# Patient Record
Sex: MALE | Race: WHITE | NOT HISPANIC OR LATINO
[De-identification: names, ages, dates, MRNs, and addresses within clinical notes are randomized per-mention and may not be internally consistent; named-entity substitution may affect disease eponyms.]

---

## 2021-06-18 ENCOUNTER — LABORATORY RESULT (OUTPATIENT)
Age: 26
End: 2021-06-18

## 2021-06-18 ENCOUNTER — APPOINTMENT (OUTPATIENT)
Dept: DERMATOLOGY | Facility: CLINIC | Age: 26
End: 2021-06-18
Payer: COMMERCIAL

## 2021-06-18 ENCOUNTER — NON-APPOINTMENT (OUTPATIENT)
Age: 26
End: 2021-06-18

## 2021-06-18 VITALS — HEIGHT: 72 IN | WEIGHT: 165 LBS | BODY MASS INDEX: 22.35 KG/M2

## 2021-06-18 DIAGNOSIS — L70.0 ACNE VULGARIS: ICD-10-CM

## 2021-06-18 PROCEDURE — 99204 OFFICE O/P NEW MOD 45 MIN: CPT | Mod: 25

## 2021-06-18 PROCEDURE — 11102 TANGNTL BX SKIN SINGLE LES: CPT

## 2021-06-18 RX ORDER — CLINDAMYCIN PHOSPHATE 10 MG/ML
1 LOTION TOPICAL
Qty: 1 | Refills: 6 | Status: ACTIVE | COMMUNITY
Start: 2021-06-18 | End: 1900-01-01

## 2021-07-12 ENCOUNTER — NON-APPOINTMENT (OUTPATIENT)
Age: 26
End: 2021-07-12

## 2022-03-20 ENCOUNTER — TRANSCRIPTION ENCOUNTER (OUTPATIENT)
Age: 27
End: 2022-03-20

## 2022-04-29 ENCOUNTER — APPOINTMENT (OUTPATIENT)
Dept: INTERNAL MEDICINE | Facility: CLINIC | Age: 27
End: 2022-04-29

## 2022-04-29 ENCOUNTER — APPOINTMENT (OUTPATIENT)
Dept: INTERNAL MEDICINE | Facility: CLINIC | Age: 27
End: 2022-04-29
Payer: COMMERCIAL

## 2022-04-29 ENCOUNTER — NON-APPOINTMENT (OUTPATIENT)
Age: 27
End: 2022-04-29

## 2022-04-29 VITALS
BODY MASS INDEX: 20.45 KG/M2 | RESPIRATION RATE: 15 BRPM | OXYGEN SATURATION: 98 % | SYSTOLIC BLOOD PRESSURE: 105 MMHG | HEART RATE: 60 BPM | TEMPERATURE: 98.1 F | DIASTOLIC BLOOD PRESSURE: 65 MMHG | HEIGHT: 72 IN | WEIGHT: 151 LBS

## 2022-04-29 VITALS — SYSTOLIC BLOOD PRESSURE: 102 MMHG | DIASTOLIC BLOOD PRESSURE: 70 MMHG

## 2022-04-29 DIAGNOSIS — Z80.8 FAMILY HISTORY OF MALIGNANT NEOPLASM OF OTHER ORGANS OR SYSTEMS: ICD-10-CM

## 2022-04-29 DIAGNOSIS — Z82.69 FAMILY HISTORY OF OTHER DISEASES OF THE MUSCULOSKELETAL SYSTEM AND CONNECTIVE TISSUE: ICD-10-CM

## 2022-04-29 DIAGNOSIS — S61.219A LACERATION W/OUT FOREIGN BODY OF UNSPECIFIED FINGER W/OUT DAMAGE TO NAIL, INITIAL ENCOUNTER: ICD-10-CM

## 2022-04-29 DIAGNOSIS — Z80.42 FAMILY HISTORY OF MALIGNANT NEOPLASM OF PROSTATE: ICD-10-CM

## 2022-04-29 DIAGNOSIS — J45.990 EXERCISE INDUCED BRONCHOSPASM: ICD-10-CM

## 2022-04-29 DIAGNOSIS — Z81.8 FAMILY HISTORY OF OTHER MENTAL AND BEHAVIORAL DISORDERS: ICD-10-CM

## 2022-04-29 DIAGNOSIS — Z86.19 PERSONAL HISTORY OF OTHER INFECTIOUS AND PARASITIC DISEASES: ICD-10-CM

## 2022-04-29 DIAGNOSIS — Z80.3 FAMILY HISTORY OF MALIGNANT NEOPLASM OF BREAST: ICD-10-CM

## 2022-04-29 DIAGNOSIS — Z83.518 FAMILY HISTORY OF OTHER SPECIFIED EYE DISORDER: ICD-10-CM

## 2022-04-29 DIAGNOSIS — Z80.0 FAMILY HISTORY OF MALIGNANT NEOPLASM OF DIGESTIVE ORGANS: ICD-10-CM

## 2022-04-29 DIAGNOSIS — R31.29 OTHER MICROSCOPIC HEMATURIA: ICD-10-CM

## 2022-04-29 PROCEDURE — 99385 PREV VISIT NEW AGE 18-39: CPT

## 2022-04-29 RX ORDER — CALCIUM CARBONATE 500 MG/1
500 TABLET, CHEWABLE ORAL
Refills: 0 | Status: ACTIVE | COMMUNITY
Start: 2022-04-29

## 2022-04-29 NOTE — REVIEW OF SYSTEMS
[Recent Change In Weight] : ~T recent weight change [Joint Pain] : joint pain [Negative] : Heme/Lymph [Chest Pain] : no chest pain [Palpitations] : no palpitations [Claudication] : no  leg claudication [Lower Ext Edema] : no lower extremity edema [Shortness Of Breath] : no shortness of breath [Wheezing] : no wheezing [Cough] : no cough [Abdominal Pain] : no abdominal pain [Dyspnea on Exertion] : not dyspnea on exertion [Nausea] : no nausea [Constipation] : no constipation [Diarrhea] : no diarrhea [Vomiting] : no vomiting [Heartburn] : no heartburn [Melena] : no melena [Dysuria] : no dysuria [Incontinence] : no incontinence [Nocturia] : no nocturia [Hematuria] : no hematuria [Joint Stiffness] : no joint stiffness [Muscle Pain] : no muscle pain [Back Pain] : no back pain [Joint Swelling] : no joint swelling [Itching] : no itching [Mole Changes] : no mole changes [Skin Rash] : no skin rash [Headache] : no headache [Dizziness] : no dizziness [Fainting] : no fainting [Unsteady Walk] : no ataxia [Insomnia] : no insomnia [Anxiety] : no anxiety [Depression] : no depression [Easy Bleeding] : no easy bleeding [Easy Bruising] : no easy bruising [Swollen Glands] : no swollen glands [FreeTextEntry2] : Intentional 15 lbs weight loss in less than a year. [FreeTextEntry3] : Wears corrective lens, [FreeTextEntry4] : cc ringing in the ear, works in boiler with very loud noise [FreeTextEntry9] : Occ knee pain if he sits too much,  [FreeTextEntry7] : Takes TUMs about once a month,

## 2022-04-29 NOTE — HEALTH RISK ASSESSMENT
[Very Good] : ~his/her~  mood as very good [Never] : Never [No] : In the past 12 months have you used drugs other than those required for medical reasons? No [One fall no injury in past year] : Patient reported one fall in the past year without injury [0] : 2) Feeling down, depressed, or hopeless: Not at all (0) [PHQ-2 Negative - No further assessment needed] : PHQ-2 Negative - No further assessment needed [HIV test declined] : HIV test declined [Hepatitis C test declined] : Hepatitis C test declined [None] : None [With Significant Other] : lives with significant other [# of Members in Household ___] :  household currently consist of [unfilled] member(s) [Employed] : employed [College] : College [Significant Other] : lives with significant other [# Of Children ___] : has [unfilled] children [Feels Safe at Home] : Feels safe at home [Fully functional (bathing, dressing, toileting, transferring, walking, feeding)] : Fully functional (bathing, dressing, toileting, transferring, walking, feeding) [Fully functional (using the telephone, shopping, preparing meals, housekeeping, doing laundry, using] : Fully functional and needs no help or supervision to perform IADLs (using the telephone, shopping, preparing meals, housekeeping, doing laundry, using transportation, managing medications and managing finances) [Smoke Detector] : smoke detector [Carbon Monoxide Detector] : carbon monoxide detector [Seat Belt] :  uses seat belt [Audit-CScore] : 0 [de-identified] : Occ long walk, no formal exercise, [[id number]] : 0 [EyeExamDate] : 2018 [Change in mental status noted] : No change in mental status noted [Reports changes in hearing] : Reports no changes in hearing [Reports changes in vision] : Reports no changes in vision [Reports changes in dental health] : Reports no changes in dental health [de-identified] : dentist - 2/2022

## 2022-04-29 NOTE — PHYSICAL EXAM
[No Acute Distress] : no acute distress [Well Nourished] : well nourished [Well Developed] : well developed [Well-Appearing] : well-appearing [Normal Sclera/Conjunctiva] : normal sclera/conjunctiva [PERRL] : pupils equal round and reactive to light [EOMI] : extraocular movements intact [Normal Outer Ear/Nose] : the outer ears and nose were normal in appearance [Normal Oropharynx] : the oropharynx was normal [Normal TMs] : both tympanic membranes were normal [Normal Nasal Mucosa] : the nasal mucosa was normal [No JVD] : no jugular venous distention [No Lymphadenopathy] : no lymphadenopathy [Supple] : supple [No Respiratory Distress] : no respiratory distress  [Clear to Auscultation] : lungs were clear to auscultation bilaterally [Normal Rate] : normal rate  [Regular Rhythm] : with a regular rhythm [Normal S1, S2] : normal S1 and S2 [No Carotid Bruits] : no carotid bruits [Pedal Pulses Present] : the pedal pulses are present [No Edema] : there was no peripheral edema [No Extremity Clubbing/Cyanosis] : no extremity clubbing/cyanosis [Normal Appearance] : normal in appearance [No Masses] : no palpable masses [No Nipple Discharge] : no nipple discharge [No Axillary Lymphadenopathy] : no axillary lymphadenopathy [Soft] : abdomen soft [Non Tender] : non-tender [Non-distended] : non-distended [Normal Bowel Sounds] : normal bowel sounds [Normal Supraclavicular Nodes] : no supraclavicular lymphadenopathy [Normal Axillary Nodes] : no axillary lymphadenopathy [Normal Posterior Cervical Nodes] : no posterior cervical lymphadenopathy [Normal Anterior Cervical Nodes] : no anterior cervical lymphadenopathy [No CVA Tenderness] : no CVA  tenderness [No Spinal Tenderness] : no spinal tenderness [No Joint Swelling] : no joint swelling [Grossly Normal Strength/Tone] : grossly normal strength/tone [No Rash] : no rash [Coordination Grossly Intact] : coordination grossly intact [No Focal Deficits] : no focal deficits [Normal Gait] : normal gait [Speech Grossly Normal] : speech grossly normal [Normal Affect] : the affect was normal [Alert and Oriented x3] : oriented to person, place, and time [Normal Mood] : the mood was normal [de-identified] : male in stated age,

## 2022-04-29 NOTE — HISTORY OF PRESENT ILLNESS
[FreeTextEntry1] : Patient presented for the first time for transition of care, she's looking for a new PCP and requesting a PE.  Last PE was in 2018. [de-identified] : He feels well and has no new complaint.\par \par Pt was well and did not get sick throughout the COVID pandemic.  He had 3 doses COVID vaccine.  He is concerned about asymptomatic infection, and would like to have antibody test.

## 2022-04-29 NOTE — ASSESSMENT
[FreeTextEntry1] : Pt presented for the first time for transition of care and PE.  He feels well w/o any complaint.  Exam was unremarkable.  He was reminded to have routine eye exam and dental care.  I also gave him Rx to check routine labs.

## 2022-05-02 LAB
25(OH)D3 SERPL-MCNC: 13 NG/ML
ALBUMIN SERPL ELPH-MCNC: 4.7 G/DL
ALP BLD-CCNC: 92 U/L
ALT SERPL-CCNC: 29 U/L
ANION GAP SERPL CALC-SCNC: 11 MMOL/L
APPEARANCE: CLEAR
AST SERPL-CCNC: 26 U/L
BASOPHILS # BLD AUTO: 0.06 K/UL
BASOPHILS NFR BLD AUTO: 1.2 %
BILIRUB SERPL-MCNC: 0.3 MG/DL
BILIRUBIN URINE: NEGATIVE
BLOOD URINE: NEGATIVE
BUN SERPL-MCNC: 18 MG/DL
CALCIUM SERPL-MCNC: 9.9 MG/DL
CHLORIDE SERPL-SCNC: 106 MMOL/L
CHOLEST SERPL-MCNC: 181 MG/DL
CO2 SERPL-SCNC: 22 MMOL/L
COLOR: COLORLESS
COVID-19 NUCLEOCAPSID  GAM ANTIBODY INTERPRETATION: NEGATIVE
COVID-19 SPIKE DOMAIN ANTIBODY INTERPRETATION: POSITIVE
CREAT SERPL-MCNC: 1.06 MG/DL
EGFR: 98 ML/MIN/1.73M2
EOSINOPHIL # BLD AUTO: 0.09 K/UL
EOSINOPHIL NFR BLD AUTO: 1.8 %
FOLATE SERPL-MCNC: 5.6 NG/ML
GLUCOSE QUALITATIVE U: NEGATIVE
GLUCOSE SERPL-MCNC: 95 MG/DL
HCT VFR BLD CALC: 44.3 %
HDLC SERPL-MCNC: 62 MG/DL
HGB BLD-MCNC: 14 G/DL
IMM GRANULOCYTES NFR BLD AUTO: 0.2 %
KETONES URINE: NEGATIVE
LDLC SERPL CALC-MCNC: 108 MG/DL
LEUKOCYTE ESTERASE URINE: NEGATIVE
LYMPHOCYTES # BLD AUTO: 1.71 K/UL
LYMPHOCYTES NFR BLD AUTO: 34.1 %
MAN DIFF?: NORMAL
MCHC RBC-ENTMCNC: 29.3 PG
MCHC RBC-ENTMCNC: 31.6 GM/DL
MCV RBC AUTO: 92.7 FL
MONOCYTES # BLD AUTO: 0.66 K/UL
MONOCYTES NFR BLD AUTO: 13.1 %
NEUTROPHILS # BLD AUTO: 2.49 K/UL
NEUTROPHILS NFR BLD AUTO: 49.6 %
NITRITE URINE: NEGATIVE
NONHDLC SERPL-MCNC: 120 MG/DL
PH URINE: 6
PLATELET # BLD AUTO: 360 K/UL
POTASSIUM SERPL-SCNC: 5.3 MMOL/L
PROT SERPL-MCNC: 6.9 G/DL
PROTEIN URINE: NEGATIVE
RBC # BLD: 4.78 M/UL
RBC # FLD: 11.8 %
SARS-COV-2 AB SERPL IA-ACNC: >250 U/ML
SARS-COV-2 AB SERPL QL IA: 0.07 INDEX
SODIUM SERPL-SCNC: 139 MMOL/L
SPECIFIC GRAVITY URINE: 1.01
TRIGL SERPL-MCNC: 60 MG/DL
UROBILINOGEN URINE: NORMAL
VIT B12 SERPL-MCNC: 378 PG/ML
WBC # FLD AUTO: 5.02 K/UL

## 2022-08-25 ENCOUNTER — NON-APPOINTMENT (OUTPATIENT)
Age: 27
End: 2022-08-25

## 2022-08-26 ENCOUNTER — TRANSCRIPTION ENCOUNTER (OUTPATIENT)
Age: 27
End: 2022-08-26

## 2022-12-30 ENCOUNTER — APPOINTMENT (OUTPATIENT)
Dept: INTERNAL MEDICINE | Facility: CLINIC | Age: 27
End: 2022-12-30
Payer: COMMERCIAL

## 2022-12-30 PROCEDURE — 99441: CPT

## 2023-01-09 NOTE — ADDENDUM
[FreeTextEntry1] : I got an email from coding department that this pt's diagnosis cannot be accepted for billing as tick bite.\par \par I called pt and finally got through to him, clarified that the tick bite was on R thigh.  Diagnosis updated.

## 2023-01-09 NOTE — PHYSICAL EXAM
[No Acute Distress] : no acute distress [Speech Grossly Normal] : speech grossly normal [Alert and Oriented x3] : oriented to person, place, and time [de-identified] : Unable to examine patient due to telephonic encounter,

## 2023-01-09 NOTE — HISTORY OF PRESENT ILLNESS
[FreeTextEntry8] : Pt called today to request blood test.\par \par Patient apparently was exposed to a tick bite a few months ago in [facility] at [facility].  He was asymptomatic but was evaluated at urgent care center.  At that time he was treated with 1 dose of doxycycline.  He was advised to have labs in a few months.\par \par Patient is asymptomatic at present.  He requested to be tested for both Lyme disease and [place] spotted fever.

## 2023-02-23 ENCOUNTER — NON-APPOINTMENT (OUTPATIENT)
Age: 28
End: 2023-02-23

## 2023-02-24 ENCOUNTER — NON-APPOINTMENT (OUTPATIENT)
Age: 28
End: 2023-02-24

## 2023-02-24 ENCOUNTER — APPOINTMENT (OUTPATIENT)
Dept: INTERNAL MEDICINE | Facility: CLINIC | Age: 28
End: 2023-02-24
Payer: COMMERCIAL

## 2023-02-24 VITALS — TEMPERATURE: 98.1 F

## 2023-02-24 VITALS — HEIGHT: 72 IN | WEIGHT: 153 LBS | BODY MASS INDEX: 20.72 KG/M2

## 2023-02-24 VITALS
SYSTOLIC BLOOD PRESSURE: 112 MMHG | RESPIRATION RATE: 14 BRPM | DIASTOLIC BLOOD PRESSURE: 76 MMHG | HEART RATE: 72 BPM | OXYGEN SATURATION: 97 %

## 2023-02-24 PROCEDURE — 99202 OFFICE O/P NEW SF 15 MIN: CPT | Mod: 25

## 2023-02-24 PROCEDURE — 99212 OFFICE O/P EST SF 10 MIN: CPT

## 2023-02-24 NOTE — PHYSICAL EXAM
[No Acute Distress] : no acute distress [Well Nourished] : well nourished [Well-Appearing] : well-appearing [No Rash] : no rash [No Skin Lesions] : no skin lesions [Normal] : affect was normal and insight and judgment were intact

## 2023-02-24 NOTE — HISTORY OF PRESENT ILLNESS
[FreeTextEntry8] : LUIS FELIPE ALMANZA is a 27 year old male who presents for a new patient/focused evaluation, to establish care.\par He requests testing for Lyme Disease and [place] Spotted Fever:\par He states that sometime during the summer 2022 had a tick bite on his right thigh while on [facility], evaluated at local urgent care, given one dose of Doxycycline, no subsequent rash/fever/chills/joint pains. He is requesting testing for Lyme and RMSF out of precaution as he states he was in an area where he was told Lyme and RMSF are endemic. \par

## 2023-02-24 NOTE — PLAN
[FreeTextEntry1] : Subsequent visit for h/o right thigh tick bite: no induration or rash noted in area of prior tick bite. No systemic symptoms of illness. Per patient's request and out of an abundance of precaution given the area where he had the tick bite, labs drawn for Lyme and RMSF today.

## 2023-02-28 ENCOUNTER — TRANSCRIPTION ENCOUNTER (OUTPATIENT)
Age: 28
End: 2023-02-28

## 2023-02-28 LAB
B BURGDOR AB SER-IMP: NEGATIVE
B BURGDOR IGG+IGM SER QL: 0.15 INDEX
R RICKETTSI IGG CSF-ACNC: NEGATIVE
R RICKETTSI IGM CSF-ACNC: 0.84 INDEX

## 2023-03-17 ENCOUNTER — TRANSCRIPTION ENCOUNTER (OUTPATIENT)
Age: 28
End: 2023-03-17

## 2023-05-13 ENCOUNTER — NON-APPOINTMENT (OUTPATIENT)
Age: 28
End: 2023-05-13

## 2023-05-13 ENCOUNTER — LABORATORY RESULT (OUTPATIENT)
Age: 28
End: 2023-05-13

## 2023-05-13 ENCOUNTER — APPOINTMENT (OUTPATIENT)
Dept: INTERNAL MEDICINE | Facility: CLINIC | Age: 28
End: 2023-05-13
Payer: COMMERCIAL

## 2023-05-13 VITALS — HEIGHT: 72 IN | WEIGHT: 150 LBS | BODY MASS INDEX: 20.32 KG/M2

## 2023-05-13 VITALS
RESPIRATION RATE: 14 BRPM | HEART RATE: 72 BPM | OXYGEN SATURATION: 98 % | DIASTOLIC BLOOD PRESSURE: 66 MMHG | SYSTOLIC BLOOD PRESSURE: 116 MMHG
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
## 2023-05-13 DIAGNOSIS — Z11.59 ENCOUNTER FOR SCREENING FOR OTHER VIRAL DISEASES: ICD-10-CM
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
## 2023-05-13 DIAGNOSIS — S70.369A INSECT BITE (NONVENOMOUS), UNSPECIFIED THIGH, INITIAL ENCOUNTER: ICD-10-CM

## 2023-05-13 DIAGNOSIS — Z86.03 PERSONAL HISTORY OF NEOPLASM OF UNCERTAIN BEHAVIOR: ICD-10-CM

## 2023-05-13 DIAGNOSIS — S70.361D INSECT BITE (NONVENOMOUS), RIGHT THIGH, SUBSEQUENT ENCOUNTER: ICD-10-CM

## 2023-05-13 PROCEDURE — 36415 COLL VENOUS BLD VENIPUNCTURE: CPT

## 2023-05-13 PROCEDURE — 93000 ELECTROCARDIOGRAM COMPLETE: CPT | Mod: 59

## 2023-05-13 PROCEDURE — 99395 PREV VISIT EST AGE 18-39: CPT | Mod: 25

## 2023-05-13 PROCEDURE — G0444 DEPRESSION SCREEN ANNUAL: CPT | Mod: 59

## 2023-05-13 NOTE — HISTORY OF PRESENT ILLNESS
[Other: _____] : [unfilled] [FreeTextEntry1] : annual physical, c/o occasional coolness or hands/feet [de-identified] : LUIS FELIPE ALMANZA is a 27 year old male who presents for annual comprehensive exam.\par Overall he feels pretty well.\par -For many years he feels randomly his fingertips and toes feel "cold" when the rest of his body is warm. He denies any associated discomfort/pain, bluish/yellow discoloration or numbness of digits. He doesn't think it has any particular trigger such as going from warm to cool environment, after shower, when exerting himself, etc. \par -Romel generally healthy/balanced diet. Treadmill ~1x/week for 1 hour. Active at work, not currently tracking his steps.

## 2023-05-13 NOTE — PLAN
[FreeTextEntry1] : #Subjective coolness of extremities: exam as above--normal cap refill, normal pulses. Encouraged to wear warm gloves/socks. Reviewed with patient if develops any concerning symptoms (such as discoloration of digits, pain/discomfort), to update me on this.\par \par Healthy diet and exercise reviewed.\par \par Flu, COVID19, Tetanus: all UTD per patient\par Labs drawn in office, will f/u.
Authored by Attending

## 2023-05-13 NOTE — HEALTH RISK ASSESSMENT
[Very Good] : ~his/her~  mood as very good [No] : In the past 12 months have you used drugs other than those required for medical reasons? No [No falls in past year] : Patient reported no falls in the past year [0] : 2) Feeling down, depressed, or hopeless: Not at all (0) [PHQ-2 Negative - No further assessment needed] : PHQ-2 Negative - No further assessment needed [HIV test declined] : HIV test declined [Hepatitis C test declined] : Hepatitis C test declined [With Significant Other] : lives with significant other [None] : None [Employed] : employed [Significant Other] : lives with significant other [Feels Safe at Home] : Feels safe at home [Fully functional (bathing, dressing, toileting, transferring, walking, feeding)] : Fully functional (bathing, dressing, toileting, transferring, walking, feeding) [Fully functional (using the telephone, shopping, preparing meals, housekeeping, doing laundry, using] : Fully functional and needs no help or supervision to perform IADLs (using the telephone, shopping, preparing meals, housekeeping, doing laundry, using transportation, managing medications and managing finances) [Reports normal functional visual acuity (ie: able to read med bottle)] : Reports normal functional visual acuity [Smoke Detector] : smoke detector [Carbon Monoxide Detector] : carbon monoxide detector [Safety elements used in home] : safety elements used in home [Seat Belt] :  uses seat belt [Sunscreen] : uses sunscreen [Never] : Never [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, dentist [de-identified] : as above [de-identified] : as above [[id number]] : 0 [Change in mental status noted] : No change in mental status noted [Language] : denies difficulty with language [Behavior] : denies difficulty with behavior [Learning/Retaining New Information] : denies difficulty learning/retaining new information [Handling Complex Tasks] : denies difficulty handling complex tasks [Reasoning] : denies difficulty with reasoning [Spatial Ability and Orientation] : denies difficulty with spatial ability and orientation [Sexually Active] : not sexually active [Reports changes in hearing] : Reports no changes in hearing [Reports changes in dental health] : Reports no changes in dental health [Reports changes in vision] : Reports no changes in vision [TB Exposure] : is not being exposed to tuberculosis [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

## 2023-05-13 NOTE — PHYSICAL EXAM
[No Carotid Bruits] : no carotid bruits [No Abdominal Bruit] : a ~M bruit was not heard ~T in the abdomen [No Varicosities] : no varicosities [Pedal Pulses Present] : the pedal pulses are present [No Edema] : there was no peripheral edema [No Extremity Clubbing/Cyanosis] : no extremity clubbing/cyanosis [No Palpable Aorta] : no palpable aorta [No Axillary Lymphadenopathy] : no axillary lymphadenopathy [Normal] : soft, non-tender, non-distended, no masses palpated, no HSM and normal bowel sounds [Urethral Meatus] : meatus normal [Penis Abnormality] : normal circumcised penis [Testes Tenderness] : no tenderness of the testes [Testes Mass (___cm)] : there were no testicular masses [de-identified] : grossly normal capillary refill, 2+ distal pulses all extremities

## 2023-07-27 LAB
25(OH)D3 SERPL-MCNC: 37.5 NG/ML
ALBUMIN SERPL ELPH-MCNC: 4.5 G/DL
ALP BLD-CCNC: 83 U/L
ALT SERPL-CCNC: 30 U/L
ANION GAP SERPL CALC-SCNC: 14 MMOL/L
APPEARANCE: ABNORMAL
AST SERPL-CCNC: 26 U/L
BASOPHILS # BLD AUTO: 0.06 K/UL
BASOPHILS NFR BLD AUTO: 1.7 %
BILIRUB SERPL-MCNC: 0.2 MG/DL
BILIRUBIN URINE: ABNORMAL
BLOOD URINE: NEGATIVE
BUN SERPL-MCNC: 15 MG/DL
CALCIUM SERPL-MCNC: 9.7 MG/DL
CHLORIDE SERPL-SCNC: 109 MMOL/L
CHOLEST SERPL-MCNC: 226 MG/DL
CO2 SERPL-SCNC: 22 MMOL/L
COLOR: NORMAL
CREAT SERPL-MCNC: 1.08 MG/DL
EGFR: 95 ML/MIN/1.73M2
EOSINOPHIL # BLD AUTO: 0.1 K/UL
EOSINOPHIL NFR BLD AUTO: 2.8 %
ESTIMATED AVERAGE GLUCOSE: 100 MG/DL
GLUCOSE QUALITATIVE U: NEGATIVE MG/DL
GLUCOSE SERPL-MCNC: 91 MG/DL
HBA1C MFR BLD HPLC: 5.1 %
HCT VFR BLD CALC: 39.5 %
HDLC SERPL-MCNC: 77 MG/DL
HGB BLD-MCNC: 12.6 G/DL
IMM GRANULOCYTES NFR BLD AUTO: 0 %
KETONES URINE: ABNORMAL MG/DL
LDLC SERPL CALC-MCNC: 128 MG/DL
LEUKOCYTE ESTERASE URINE: NEGATIVE
LYMPHOCYTES # BLD AUTO: 1.4 K/UL
LYMPHOCYTES NFR BLD AUTO: 39.2 %
MAN DIFF?: NORMAL
MCHC RBC-ENTMCNC: 28.4 PG
MCHC RBC-ENTMCNC: 31.9 GM/DL
MCV RBC AUTO: 89.2 FL
MONOCYTES # BLD AUTO: 0.5 K/UL
MONOCYTES NFR BLD AUTO: 14 %
NEUTROPHILS # BLD AUTO: 1.51 K/UL
NEUTROPHILS NFR BLD AUTO: 42.3 %
NITRITE URINE: NEGATIVE
NONHDLC SERPL-MCNC: 150 MG/DL
PH URINE: 6
PLATELET # BLD AUTO: 332 K/UL
POTASSIUM SERPL-SCNC: 4.3 MMOL/L
PROT SERPL-MCNC: 7 G/DL
PROTEIN URINE: NORMAL MG/DL
RBC # BLD: 4.43 M/UL
RBC # FLD: 13.1 %
SODIUM SERPL-SCNC: 146 MMOL/L
SPECIFIC GRAVITY URINE: 1.03
TRIGL SERPL-MCNC: 107 MG/DL
TSH SERPL-ACNC: 0.89 UIU/ML
UROBILINOGEN URINE: 1 MG/DL
WBC # FLD AUTO: 3.57 K/UL

## 2023-08-06 ENCOUNTER — NON-APPOINTMENT (OUTPATIENT)
Age: 28
End: 2023-08-06

## 2023-08-31 ENCOUNTER — TRANSCRIPTION ENCOUNTER (OUTPATIENT)
Age: 28
End: 2023-08-31

## 2023-09-06 LAB
ALBUMIN SERPL ELPH-MCNC: 4.8 G/DL
ALP BLD-CCNC: 84 U/L
ALT SERPL-CCNC: 25 U/L
ANION GAP SERPL CALC-SCNC: 11 MMOL/L
AST SERPL-CCNC: 23 U/L
BILIRUB SERPL-MCNC: 0.3 MG/DL
BUN SERPL-MCNC: 14 MG/DL
CALCIUM SERPL-MCNC: 9.9 MG/DL
CHLORIDE SERPL-SCNC: 106 MMOL/L
CHOLEST SERPL-MCNC: 200 MG/DL
CO2 SERPL-SCNC: 23 MMOL/L
CREAT SERPL-MCNC: 0.99 MG/DL
EGFR: 106 ML/MIN/1.73M2
FERRITIN SERPL-MCNC: 10 NG/ML
FOLATE SERPL-MCNC: 4.2 NG/ML
GLUCOSE SERPL-MCNC: 94 MG/DL
HDLC SERPL-MCNC: 71 MG/DL
IRON SATN MFR SERPL: 5 %
IRON SERPL-MCNC: 23 UG/DL
LDLC SERPL CALC-MCNC: 117 MG/DL
NONHDLC SERPL-MCNC: 129 MG/DL
POTASSIUM SERPL-SCNC: 4.8 MMOL/L
PROT SERPL-MCNC: 7.1 G/DL
SODIUM SERPL-SCNC: 140 MMOL/L
TIBC SERPL-MCNC: 470 UG/DL
TRIGL SERPL-MCNC: 67 MG/DL
UIBC SERPL-MCNC: 448 UG/DL
VIT B12 SERPL-MCNC: 666 PG/ML

## 2023-09-28 ENCOUNTER — TRANSCRIPTION ENCOUNTER (OUTPATIENT)
Age: 28
End: 2023-09-28

## 2023-09-30 ENCOUNTER — TRANSCRIPTION ENCOUNTER (OUTPATIENT)
Age: 28
End: 2023-09-30

## 2023-10-02 ENCOUNTER — APPOINTMENT (OUTPATIENT)
Dept: GASTROENTEROLOGY | Facility: CLINIC | Age: 28
End: 2023-10-02
Payer: COMMERCIAL

## 2023-10-02 VITALS
SYSTOLIC BLOOD PRESSURE: 114 MMHG | HEART RATE: 65 BPM | DIASTOLIC BLOOD PRESSURE: 60 MMHG | HEIGHT: 72 IN | BODY MASS INDEX: 22.08 KG/M2 | OXYGEN SATURATION: 97 % | WEIGHT: 163 LBS | TEMPERATURE: 97.7 F | RESPIRATION RATE: 14 BRPM

## 2023-10-02 PROCEDURE — 99203 OFFICE O/P NEW LOW 30 MIN: CPT

## 2023-11-30 ENCOUNTER — TRANSCRIPTION ENCOUNTER (OUTPATIENT)
Age: 28
End: 2023-11-30

## 2023-12-06 LAB
BASOPHILS # BLD AUTO: 0.05 K/UL
BASOPHILS NFR BLD AUTO: 1.1 %
EOSINOPHIL # BLD AUTO: 0.13 K/UL
EOSINOPHIL NFR BLD AUTO: 2.8 %
FERRITIN SERPL-MCNC: 46 NG/ML
HCT VFR BLD CALC: 45.1 %
HGB BLD-MCNC: 14.5 G/DL
IMM GRANULOCYTES NFR BLD AUTO: 0.2 %
IRON SATN MFR SERPL: 42 %
IRON SERPL-MCNC: 160 UG/DL
LYMPHOCYTES # BLD AUTO: 2.12 K/UL
LYMPHOCYTES NFR BLD AUTO: 45 %
MAN DIFF?: NORMAL
MCHC RBC-ENTMCNC: 27.6 PG
MCHC RBC-ENTMCNC: 32.2 GM/DL
MCV RBC AUTO: 85.9 FL
MONOCYTES # BLD AUTO: 0.63 K/UL
MONOCYTES NFR BLD AUTO: 13.4 %
NEUTROPHILS # BLD AUTO: 1.77 K/UL
NEUTROPHILS NFR BLD AUTO: 37.5 %
PLATELET # BLD AUTO: 341 K/UL
RBC # BLD: 5.25 M/UL
RBC # BLD: 5.25 M/UL
RBC # FLD: 16.1 %
RETICS # AUTO: 1.1 %
RETICS AGGREG/RBC NFR: 56.7 K/UL
TIBC SERPL-MCNC: 386 UG/DL
UIBC SERPL-MCNC: 226 UG/DL
WBC # FLD AUTO: 4.71 K/UL

## 2023-12-11 ENCOUNTER — APPOINTMENT (OUTPATIENT)
Dept: GASTROENTEROLOGY | Facility: CLINIC | Age: 28
End: 2023-12-11

## 2023-12-23 ENCOUNTER — TRANSCRIPTION ENCOUNTER (OUTPATIENT)
Age: 28
End: 2023-12-23

## 2023-12-26 ENCOUNTER — TRANSCRIPTION ENCOUNTER (OUTPATIENT)
Age: 28
End: 2023-12-26

## 2024-03-03 ENCOUNTER — TRANSCRIPTION ENCOUNTER (OUTPATIENT)
Age: 29
End: 2024-03-03

## 2024-03-04 ENCOUNTER — APPOINTMENT (OUTPATIENT)
Dept: DERMATOLOGY | Facility: CLINIC | Age: 29
End: 2024-03-04
Payer: COMMERCIAL

## 2024-03-04 DIAGNOSIS — Z12.83 ENCOUNTER FOR SCREENING FOR MALIGNANT NEOPLASM OF SKIN: ICD-10-CM

## 2024-03-04 DIAGNOSIS — D22.9 MELANOCYTIC NEVI, UNSPECIFIED: ICD-10-CM

## 2024-03-04 DIAGNOSIS — L72.0 EPIDERMAL CYST: ICD-10-CM

## 2024-03-04 PROCEDURE — 99213 OFFICE O/P EST LOW 20 MIN: CPT

## 2024-06-13 ENCOUNTER — NON-APPOINTMENT (OUTPATIENT)
Age: 29
End: 2024-06-13

## 2024-06-13 ENCOUNTER — APPOINTMENT (OUTPATIENT)
Dept: OPHTHALMOLOGY | Facility: CLINIC | Age: 29
End: 2024-06-13
Payer: COMMERCIAL

## 2024-06-13 PROCEDURE — 92004 COMPRE OPH EXAM NEW PT 1/>: CPT

## 2024-06-14 ENCOUNTER — NON-APPOINTMENT (OUTPATIENT)
Age: 29
End: 2024-06-14

## 2024-06-14 ENCOUNTER — LABORATORY RESULT (OUTPATIENT)
Age: 29
End: 2024-06-14

## 2024-06-14 ENCOUNTER — APPOINTMENT (OUTPATIENT)
Dept: INTERNAL MEDICINE | Facility: CLINIC | Age: 29
End: 2024-06-14
Payer: COMMERCIAL

## 2024-06-14 VITALS — HEIGHT: 72 IN | WEIGHT: 165 LBS | BODY MASS INDEX: 22.35 KG/M2

## 2024-06-14 VITALS
TEMPERATURE: 98.6 F | HEART RATE: 52 BPM | SYSTOLIC BLOOD PRESSURE: 98 MMHG | DIASTOLIC BLOOD PRESSURE: 62 MMHG | RESPIRATION RATE: 14 BRPM | OXYGEN SATURATION: 98 %

## 2024-06-14 DIAGNOSIS — D50.9 IRON DEFICIENCY ANEMIA, UNSPECIFIED: ICD-10-CM

## 2024-06-14 DIAGNOSIS — E78.5 HYPERLIPIDEMIA, UNSPECIFIED: ICD-10-CM

## 2024-06-14 DIAGNOSIS — Z00.00 ENCOUNTER FOR GENERAL ADULT MEDICAL EXAMINATION W/OUT ABNORMAL FINDINGS: ICD-10-CM

## 2024-06-14 DIAGNOSIS — Z86.2 PERSONAL HISTORY OF DISEASES OF THE BLOOD AND BLOOD-FORMING ORGANS AND CERTAIN DISORDERS INVOLVING THE IMMUNE MECHANISM: ICD-10-CM

## 2024-06-14 PROCEDURE — 93000 ELECTROCARDIOGRAM COMPLETE: CPT

## 2024-06-14 PROCEDURE — 36415 COLL VENOUS BLD VENIPUNCTURE: CPT

## 2024-06-14 PROCEDURE — 99395 PREV VISIT EST AGE 18-39: CPT

## 2024-06-14 RX ORDER — CHLORHEXIDINE GLUCONATE 4 %
325 (65 FE) LIQUID (ML) TOPICAL
Refills: 0 | Status: ACTIVE | COMMUNITY
Start: 2024-06-14

## 2024-06-14 RX ORDER — ADHESIVE TAPE 3"X 2.3 YD
50 MCG TAPE, NON-MEDICATED TOPICAL
Refills: 0 | Status: ACTIVE | COMMUNITY
Start: 2024-06-14

## 2024-06-14 NOTE — HISTORY OF PRESENT ILLNESS
[Spouse] : spouse [FreeTextEntry1] : annual physical follow-up iron deficiency [de-identified] : LUIS FELIPE ALMANZA is a 28 year old male who presents for annual comprehensive exam. He updates that he did meet with Dr. Ruiz (GI) re: Iron deficiency anemia whose impression was that given patient was donating blood ~q2 months, this was likely cause of IGNACIO in his case. He was initiated on daily iron, then as CBC noted normal H/H, was lowered to taking 3x/week which he has continued to date. He feels just the same, was never fatigued even while anemic. -Admits does not exercise much at all. Diet generally healthy--has started eating more fruit over the past year. -Left wrist pain occasionally at medial aspect and wrist. Initially had slight numbness in fingertips which resolved with using a wrist splint, then this pain started, usually after driving/turning wrist repetitively when turning the wheel. He is right-handed. No pain during exam today.

## 2024-06-14 NOTE — PLAN
[FreeTextEntry1] : #Iron deficiency anemia: continue Ferrous sulfate 325 mg 3x/week, recheck CBC with iron panel--if all normal, plan to stop iron supplement and repeat CBC in 3 months. Advised him to also update Dr. Ruiz once labwork reviewed. 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 If still persists despite above to contact me to update.    Reviewed healthy diet, encouraged him fort his year to start regular physical activity with goal of 5 days/wk 30 min sessions of cardio and light weight training.  COVID19 fully vaccinated/boostered per patient Flu vaccine missed last season Tdap UTD  F/u yearly or sooner if labs necessitate or patient's needs require.

## 2024-06-14 NOTE — HEALTH RISK ASSESSMENT
[Very Good] : ~his/her~  mood as very good [NO] : No [HIV Test offered] : HIV Test offered [Hepatitis C test offered] : Hepatitis C test offered [No] : In the past 12 months have you used drugs other than those required for medical reasons? No [No falls in past year] : Patient reported no falls in the past year [0] : 2) Feeling down, depressed, or hopeless: Not at all (0) [PHQ-2 Negative - No further assessment needed] : PHQ-2 Negative - No further assessment needed [Never] : Never [None] : None [Employed] : employed [] :  [# Of Children ___] : has [unfilled] children [Sexually Active] : sexually active [Reports normal functional visual acuity (ie: able to read med bottle)] : Reports normal functional visual acuity [Smoke Detector] : smoke detector [Carbon Monoxide Detector] : carbon monoxide detector [Safety elements used in home] : safety elements used in home [Seat Belt] :  uses seat belt [Sunscreen] : uses sunscreen [de-identified] : as above [de-identified] : as above [[id number]] : 0 [Change in mental status noted] : No change in mental status noted [Language] : denies difficulty with language [Behavior] : denies difficulty with behavior [Handling Complex Tasks] : denies difficulty handling complex tasks [Reasoning] : denies difficulty with reasoning [Spatial Ability and Orientation] : denies difficulty with spatial ability and orientation [Reports changes in hearing] : Reports no changes in hearing [Reports changes in vision] : Reports no changes in vision [Reports changes in dental health] : Reports no changes in dental health [TB Exposure] : is not being exposed to tuberculosis [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

## 2024-06-18 ENCOUNTER — TRANSCRIPTION ENCOUNTER (OUTPATIENT)
Age: 29
End: 2024-06-18

## 2024-06-18 LAB
25(OH)D3 SERPL-MCNC: 36 NG/ML
ALBUMIN SERPL ELPH-MCNC: 4.7 G/DL
ALP BLD-CCNC: 94 U/L
ALT SERPL-CCNC: 27 U/L
ANION GAP SERPL CALC-SCNC: 15 MMOL/L
APPEARANCE: CLEAR
AST SERPL-CCNC: 25 U/L
BASOPHILS # BLD AUTO: 0.04 K/UL
BASOPHILS NFR BLD AUTO: 1 %
BILIRUB SERPL-MCNC: 0.4 MG/DL
BILIRUBIN URINE: NEGATIVE
BLOOD URINE: NEGATIVE
BUN SERPL-MCNC: 14 MG/DL
CALCIUM SERPL-MCNC: 9.6 MG/DL
CHLORIDE SERPL-SCNC: 107 MMOL/L
CHOLEST SERPL-MCNC: 232 MG/DL
CO2 SERPL-SCNC: 20 MMOL/L
COLOR: YELLOW
CREAT SERPL-MCNC: 0.98 MG/DL
EGFR: 106 ML/MIN/1.73M2
EOSINOPHIL # BLD AUTO: 0.06 K/UL
EOSINOPHIL NFR BLD AUTO: 1.5 %
FERRITIN SERPL-MCNC: 123 NG/ML
GLUCOSE QUALITATIVE U: NEGATIVE MG/DL
GLUCOSE SERPL-MCNC: 91 MG/DL
HCT VFR BLD CALC: 44.2 %
HCV AB SER QL: NONREACTIVE
HCV S/CO RATIO: 0.08 S/CO
HDLC SERPL-MCNC: 66 MG/DL
HGB BLD-MCNC: 15.1 G/DL
HIV1+2 AB SPEC QL IA.RAPID: NONREACTIVE
IMM GRANULOCYTES NFR BLD AUTO: 0.2 %
IRON SATN MFR SERPL: 21 %
IRON SERPL-MCNC: 82 UG/DL
KETONES URINE: NEGATIVE MG/DL
LDLC SERPL CALC-MCNC: 148 MG/DL
LEUKOCYTE ESTERASE URINE: ABNORMAL
LYMPHOCYTES # BLD AUTO: 1.46 K/UL
LYMPHOCYTES NFR BLD AUTO: 36.4 %
MAN DIFF?: NORMAL
MCHC RBC-ENTMCNC: 31.1 PG
MCHC RBC-ENTMCNC: 34.2 GM/DL
MCV RBC AUTO: 91.1 FL
MONOCYTES # BLD AUTO: 0.42 K/UL
MONOCYTES NFR BLD AUTO: 10.5 %
NEUTROPHILS # BLD AUTO: 2.02 K/UL
NEUTROPHILS NFR BLD AUTO: 50.4 %
NITRITE URINE: NEGATIVE
NONHDLC SERPL-MCNC: 166 MG/DL
PH URINE: 6
PLATELET # BLD AUTO: 335 K/UL
POTASSIUM SERPL-SCNC: 4.4 MMOL/L
PROT SERPL-MCNC: 7.3 G/DL
PROTEIN URINE: NEGATIVE MG/DL
RBC # BLD: 4.85 M/UL
RBC # FLD: 11.9 %
SODIUM SERPL-SCNC: 141 MMOL/L
SPECIFIC GRAVITY URINE: 1.01
T4 SERPL-MCNC: 6 UG/DL
TIBC SERPL-MCNC: 382 UG/DL
TRIGL SERPL-MCNC: 101 MG/DL
TSH SERPL-ACNC: 1.4 UIU/ML
UIBC SERPL-MCNC: 300 UG/DL
UROBILINOGEN URINE: 0.2 MG/DL
WBC # FLD AUTO: 4.01 K/UL

## 2024-09-03 ENCOUNTER — TRANSCRIPTION ENCOUNTER (OUTPATIENT)
Age: 29
End: 2024-09-03

## 2024-09-15 LAB
BASOPHILS # BLD AUTO: 0.08 K/UL
BASOPHILS NFR BLD AUTO: 1.3 %
CHOLEST SERPL-MCNC: 228 MG/DL
EOSINOPHIL # BLD AUTO: 0.12 K/UL
EOSINOPHIL NFR BLD AUTO: 2 %
FERRITIN SERPL-MCNC: 140 NG/ML
HCT VFR BLD CALC: 46.2 %
HDLC SERPL-MCNC: 67 MG/DL
HGB BLD-MCNC: 15.5 G/DL
IMM GRANULOCYTES NFR BLD AUTO: 0.2 %
IRON SATN MFR SERPL: 34 %
IRON SERPL-MCNC: 114 UG/DL
LDLC SERPL CALC-MCNC: 148 MG/DL
LYMPHOCYTES # BLD AUTO: 1.34 K/UL
LYMPHOCYTES NFR BLD AUTO: 22.4 %
MAN DIFF?: NORMAL
MCHC RBC-ENTMCNC: 30.8 PG
MCHC RBC-ENTMCNC: 33.5 GM/DL
MCV RBC AUTO: 91.7 FL
MONOCYTES # BLD AUTO: 1.01 K/UL
MONOCYTES NFR BLD AUTO: 16.9 %
NEUTROPHILS # BLD AUTO: 3.41 K/UL
NEUTROPHILS NFR BLD AUTO: 57.2 %
NONHDLC SERPL-MCNC: 161 MG/DL
PLATELET # BLD AUTO: 294 K/UL
RBC # BLD: 5.04 M/UL
RBC # FLD: 11.8 %
TIBC SERPL-MCNC: 333 UG/DL
TRIGL SERPL-MCNC: 73 MG/DL
UIBC SERPL-MCNC: 219 UG/DL
WBC # FLD AUTO: 5.97 K/UL

## 2024-09-18 ENCOUNTER — TRANSCRIPTION ENCOUNTER (OUTPATIENT)
Age: 29
End: 2024-09-18

## 2024-10-08 ENCOUNTER — APPOINTMENT (OUTPATIENT)
Dept: ORTHOPEDIC SURGERY | Facility: CLINIC | Age: 29
End: 2024-10-08
Payer: COMMERCIAL

## 2024-10-08 ENCOUNTER — NON-APPOINTMENT (OUTPATIENT)
Age: 29
End: 2024-10-08

## 2024-10-08 VITALS — HEIGHT: 72 IN | WEIGHT: 165 LBS | BODY MASS INDEX: 22.35 KG/M2

## 2024-10-08 DIAGNOSIS — M25.561 PAIN IN RIGHT KNEE: ICD-10-CM

## 2024-10-08 PROCEDURE — 73562 X-RAY EXAM OF KNEE 3: CPT | Mod: RT

## 2024-10-08 PROCEDURE — 99203 OFFICE O/P NEW LOW 30 MIN: CPT

## 2024-11-22 ENCOUNTER — TRANSCRIPTION ENCOUNTER (OUTPATIENT)
Age: 29
End: 2024-11-22

## 2024-11-25 ENCOUNTER — APPOINTMENT (OUTPATIENT)
Dept: MRI IMAGING | Facility: IMAGING CENTER | Age: 29
End: 2024-11-25
Payer: COMMERCIAL

## 2024-11-25 ENCOUNTER — OUTPATIENT (OUTPATIENT)
Dept: OUTPATIENT SERVICES | Facility: HOSPITAL | Age: 29
LOS: 1 days | End: 2024-11-25
Payer: COMMERCIAL

## 2024-11-25 DIAGNOSIS — M25.561 PAIN IN RIGHT KNEE: ICD-10-CM

## 2024-11-25 PROCEDURE — 73721 MRI JNT OF LWR EXTRE W/O DYE: CPT

## 2024-11-25 PROCEDURE — 73721 MRI JNT OF LWR EXTRE W/O DYE: CPT | Mod: 26,RT

## 2025-06-25 ENCOUNTER — NON-APPOINTMENT (OUTPATIENT)
Age: 30
End: 2025-06-25

## 2025-06-27 ENCOUNTER — LABORATORY RESULT (OUTPATIENT)
Age: 30
End: 2025-06-27

## 2025-06-27 ENCOUNTER — APPOINTMENT (OUTPATIENT)
Dept: INTERNAL MEDICINE | Facility: CLINIC | Age: 30
End: 2025-06-27
Payer: COMMERCIAL

## 2025-06-27 VITALS
OXYGEN SATURATION: 99 % | RESPIRATION RATE: 14 BRPM | HEART RATE: 50 BPM | DIASTOLIC BLOOD PRESSURE: 60 MMHG | SYSTOLIC BLOOD PRESSURE: 100 MMHG

## 2025-06-27 VITALS — HEIGHT: 72 IN | BODY MASS INDEX: 21.67 KG/M2 | WEIGHT: 160 LBS

## 2025-06-27 PROCEDURE — 36415 COLL VENOUS BLD VENIPUNCTURE: CPT

## 2025-06-27 PROCEDURE — 99395 PREV VISIT EST AGE 18-39: CPT

## 2025-06-27 PROCEDURE — 93000 ELECTROCARDIOGRAM COMPLETE: CPT

## 2025-07-02 LAB
ALBUMIN SERPL ELPH-MCNC: 4.6 G/DL
ALP BLD-CCNC: 117 U/L
ALT SERPL-CCNC: 45 U/L
ANION GAP SERPL CALC-SCNC: 14 MMOL/L
APPEARANCE: CLEAR
AST SERPL-CCNC: 35 U/L
BASOPHILS # BLD AUTO: 0.04 K/UL
BASOPHILS NFR BLD AUTO: 1.2 %
BILIRUB SERPL-MCNC: 0.4 MG/DL
BILIRUBIN URINE: NEGATIVE
BLOOD URINE: ABNORMAL
BUN SERPL-MCNC: 12 MG/DL
CALCIUM SERPL-MCNC: 9.8 MG/DL
CHLORIDE SERPL-SCNC: 106 MMOL/L
CHOLEST SERPL-MCNC: 198 MG/DL
CO2 SERPL-SCNC: 20 MMOL/L
COLOR: YELLOW
CREAT SERPL-MCNC: 1.01 MG/DL
EGFRCR SERPLBLD CKD-EPI 2021: 102 ML/MIN/1.73M2
EOSINOPHIL # BLD AUTO: 0.06 K/UL
EOSINOPHIL NFR BLD AUTO: 1.8 %
ESTIMATED AVERAGE GLUCOSE: 97 MG/DL
FERRITIN SERPL-MCNC: 109 NG/ML
GLUCOSE QUALITATIVE U: NEGATIVE MG/DL
GLUCOSE SERPL-MCNC: 84 MG/DL
HBA1C MFR BLD HPLC: 5 %
HCT VFR BLD CALC: 42.3 %
HDLC SERPL-MCNC: 61 MG/DL
HGB BLD-MCNC: 14.2 G/DL
IMM GRANULOCYTES NFR BLD AUTO: 0.3 %
IRON SATN MFR SERPL: 16 %
IRON SERPL-MCNC: 56 UG/DL
KETONES URINE: NEGATIVE MG/DL
LDLC SERPL-MCNC: 127 MG/DL
LEUKOCYTE ESTERASE URINE: NEGATIVE
LYMPHOCYTES # BLD AUTO: 1.15 K/UL
LYMPHOCYTES NFR BLD AUTO: 33.7 %
MAN DIFF?: NORMAL
MCHC RBC-ENTMCNC: 30.7 PG
MCHC RBC-ENTMCNC: 33.6 G/DL
MCV RBC AUTO: 91.6 FL
MONOCYTES # BLD AUTO: 0.43 K/UL
MONOCYTES NFR BLD AUTO: 12.6 %
NEUTROPHILS # BLD AUTO: 1.72 K/UL
NEUTROPHILS NFR BLD AUTO: 50.4 %
NITRITE URINE: NEGATIVE
NONHDLC SERPL-MCNC: 137 MG/DL
PH URINE: 5.5
PLATELET # BLD AUTO: 310 K/UL
POTASSIUM SERPL-SCNC: 4.7 MMOL/L
PROT SERPL-MCNC: 7.1 G/DL
PROTEIN URINE: NEGATIVE MG/DL
RBC # BLD: 4.62 M/UL
RBC # FLD: 11.5 %
SODIUM SERPL-SCNC: 140 MMOL/L
SPECIFIC GRAVITY URINE: 1.02
T4 SERPL-MCNC: 7.2 UG/DL
TIBC SERPL-MCNC: 361 UG/DL
TRIGL SERPL-MCNC: 55 MG/DL
TSH SERPL-ACNC: 1.6 UIU/ML
UIBC SERPL-MCNC: 304 UG/DL
UROBILINOGEN URINE: 0.2 MG/DL
WBC # FLD AUTO: 3.41 K/UL